# Patient Record
Sex: MALE | Race: BLACK OR AFRICAN AMERICAN | NOT HISPANIC OR LATINO | URBAN - METROPOLITAN AREA
[De-identification: names, ages, dates, MRNs, and addresses within clinical notes are randomized per-mention and may not be internally consistent; named-entity substitution may affect disease eponyms.]

---

## 2020-08-27 ENCOUNTER — CLINICAL SUPPORT (OUTPATIENT)
Dept: URGENT CARE | Facility: CLINIC | Age: 35
End: 2020-08-27

## 2020-08-27 DIAGNOSIS — Z02.89 ENCOUNTER FOR EXAMINATION REQUIRED BY DEPARTMENT OF TRANSPORTATION (DOT): Primary | ICD-10-CM

## 2020-08-27 PROCEDURE — 99499 UNLISTED E&M SERVICE: CPT | Mod: S$GLB,,, | Performed by: PHYSICIAN ASSISTANT

## 2020-08-27 PROCEDURE — 99499 PHYSICAL, RECERT DOT/CDL: ICD-10-PCS | Mod: S$GLB,,, | Performed by: PHYSICIAN ASSISTANT

## 2021-04-05 ENCOUNTER — TELEPHONE (OUTPATIENT)
Dept: PAIN MEDICINE | Facility: CLINIC | Age: 36
End: 2021-04-05

## 2021-04-26 ENCOUNTER — OFFICE VISIT (OUTPATIENT)
Dept: PAIN MEDICINE | Facility: CLINIC | Age: 36
End: 2021-04-26
Payer: OTHER GOVERNMENT

## 2021-04-26 VITALS
OXYGEN SATURATION: 95 % | SYSTOLIC BLOOD PRESSURE: 131 MMHG | DIASTOLIC BLOOD PRESSURE: 82 MMHG | BODY MASS INDEX: 44.09 KG/M2 | WEIGHT: 308 LBS | HEART RATE: 70 BPM | HEIGHT: 70 IN

## 2021-04-26 DIAGNOSIS — M51.36 DDD (DEGENERATIVE DISC DISEASE), LUMBAR: Primary | ICD-10-CM

## 2021-04-26 DIAGNOSIS — M47.816 LUMBAR SPONDYLOSIS: ICD-10-CM

## 2021-04-26 PROCEDURE — 99213 OFFICE O/P EST LOW 20 MIN: CPT | Mod: PBBFAC,PN | Performed by: PAIN MEDICINE

## 2021-04-26 PROCEDURE — 99999 PR PBB SHADOW E&M-EST. PATIENT-LVL III: ICD-10-PCS | Mod: PBBFAC,,, | Performed by: PAIN MEDICINE

## 2021-04-26 PROCEDURE — 99204 PR OFFICE/OUTPT VISIT, NEW, LEVL IV, 45-59 MIN: ICD-10-PCS | Mod: S$PBB,,, | Performed by: PAIN MEDICINE

## 2021-04-26 PROCEDURE — 99204 OFFICE O/P NEW MOD 45 MIN: CPT | Mod: S$PBB,,, | Performed by: PAIN MEDICINE

## 2021-04-26 PROCEDURE — 99999 PR PBB SHADOW E&M-EST. PATIENT-LVL III: CPT | Mod: PBBFAC,,, | Performed by: PAIN MEDICINE

## 2023-03-22 ENCOUNTER — LAB VISIT (OUTPATIENT)
Dept: LAB | Facility: HOSPITAL | Age: 38
End: 2023-03-22
Attending: UROLOGY
Payer: OTHER GOVERNMENT

## 2023-03-22 ENCOUNTER — OFFICE VISIT (OUTPATIENT)
Dept: UROLOGY | Facility: CLINIC | Age: 38
End: 2023-03-22
Payer: OTHER GOVERNMENT

## 2023-03-22 ENCOUNTER — TELEPHONE (OUTPATIENT)
Dept: UROLOGY | Facility: CLINIC | Age: 38
End: 2023-03-22
Payer: OTHER GOVERNMENT

## 2023-03-22 VITALS
HEART RATE: 83 BPM | SYSTOLIC BLOOD PRESSURE: 132 MMHG | HEIGHT: 70 IN | DIASTOLIC BLOOD PRESSURE: 84 MMHG | BODY MASS INDEX: 45.1 KG/M2 | WEIGHT: 315 LBS

## 2023-03-22 DIAGNOSIS — E29.1 TESTICULAR FAILURE: ICD-10-CM

## 2023-03-22 DIAGNOSIS — E29.1 TESTICULAR FAILURE: Primary | ICD-10-CM

## 2023-03-22 DIAGNOSIS — I86.1 VARICOCELE: ICD-10-CM

## 2023-03-22 LAB
ESTRADIOL SERPL-MCNC: 41 PG/ML (ref 11–44)
FSH SERPL-ACNC: 2.77 MIU/ML (ref 0.95–11.95)
LH SERPL-ACNC: 2.4 MIU/ML (ref 0.6–12.1)
PROLACTIN SERPL IA-MCNC: 9.6 NG/ML (ref 3.5–19.4)
TESTOST SERPL-MCNC: 296 NG/DL (ref 304–1227)

## 2023-03-22 PROCEDURE — 84146 ASSAY OF PROLACTIN: CPT | Performed by: UROLOGY

## 2023-03-22 PROCEDURE — 83002 ASSAY OF GONADOTROPIN (LH): CPT | Performed by: UROLOGY

## 2023-03-22 PROCEDURE — 83001 ASSAY OF GONADOTROPIN (FSH): CPT | Performed by: UROLOGY

## 2023-03-22 PROCEDURE — 99203 PR OFFICE/OUTPT VISIT, NEW, LEVL III, 30-44 MIN: ICD-10-PCS | Mod: S$PBB,,, | Performed by: UROLOGY

## 2023-03-22 PROCEDURE — 99999 PR PBB SHADOW E&M-EST. PATIENT-LVL III: ICD-10-PCS | Mod: PBBFAC,,, | Performed by: UROLOGY

## 2023-03-22 PROCEDURE — 99999 PR PBB SHADOW E&M-EST. PATIENT-LVL III: CPT | Mod: PBBFAC,,, | Performed by: UROLOGY

## 2023-03-22 PROCEDURE — 84403 ASSAY OF TOTAL TESTOSTERONE: CPT | Performed by: UROLOGY

## 2023-03-22 PROCEDURE — 99203 OFFICE O/P NEW LOW 30 MIN: CPT | Mod: S$PBB,,, | Performed by: UROLOGY

## 2023-03-22 PROCEDURE — 82670 ASSAY OF TOTAL ESTRADIOL: CPT | Performed by: UROLOGY

## 2023-03-22 PROCEDURE — 99213 OFFICE O/P EST LOW 20 MIN: CPT | Mod: PBBFAC | Performed by: UROLOGY

## 2023-03-22 PROCEDURE — 36415 COLL VENOUS BLD VENIPUNCTURE: CPT | Performed by: UROLOGY

## 2023-03-22 RX ORDER — OMEPRAZOLE 20 MG/1
1 CAPSULE, DELAYED RELEASE ORAL
COMMUNITY
Start: 2022-09-19

## 2023-03-22 RX ORDER — IBUPROFEN 400 MG/1
1 TABLET ORAL
COMMUNITY
Start: 2022-09-19

## 2023-03-22 RX ORDER — TERBINAFINE HYDROCHLORIDE 250 MG/1
1 TABLET ORAL DAILY
COMMUNITY
Start: 2023-01-04

## 2023-03-22 RX ORDER — DIAPER,BRIEF,INFANT-TODD,DISP
1 EACH MISCELLANEOUS
COMMUNITY
Start: 2023-03-13

## 2023-03-22 NOTE — PROGRESS NOTES
"  Chief Complaint:  Infertility    HPI:    Mr. Daniels is a 37 y.o.  male who has been  to his wife for the past 4 years. They have been trying to achieve a pregnancy for the past 6 years but without success. Alvaro Daniels has not undergone a semen analysis. He denies a history of erectile dysfunction and ejaculatory problems.    He has achieved 2 pregnancies in the past with a different partner.    Rosalba Daniels is 37 years old. ( [birth year]) Her menses are irregular. She has not undergone prior hysterosalpingogram. She has achieved 1 prior pregnancies with a different partner.  She sees Dr. Deepti Leggett.    The couple has not undergone prior intrauterine insemination procedures.    The couple has not undergone prior in-vitro fertilization procedures.    Alvaro Daniels denies a history of exposure to harmful chemicals, toxins, and radiation.    No history of recent fevers greater than 101.5 degrees Farenheit.    No history of recent exposure to "wet heat."    No history of urological trauma or testicular torsion.    No history of prostatitis, epididymitis, and orchitis.    No history of post-pubertal mumps.    There is no known family history of fertility problems.    REVIEW OF SYSTEMS:     He denies headache, blurred vision, fever, nausea, vomiting, chills, abdominal pain, chest pain, sore throat, bleeding per rectum, cough, SOB, recent loss of consciousness, recent mental status changes, seizures, dizziness, or upper or lower extremity weakness.    PHYSICAL EXAM:     The patient generally appears in good health, is appropriately interactive, and is in no apparent distress.     Eyes: anicteric sclerae, moist conjunctivae; no lid-lag; PERRLA     HENT: Atraumatic; oropharynx clear with moist mucous membranes and no mucosal ulcerations;normal hard and soft palate.  No evidence of lymphadenopathy.    Neck: Trachea midline.  No thyromegaly.    Skin: No lesions.    Mental: Cooperative with normal " "affect.  Is oriented to time, place, and person.    Neuro: Grossly intact.    Chest: Normal inspiratory effort.   No accessory muscles.  No audible wheezes.  Respirations symmetric on inspiration and expiration.    Heart: Regular rhythm.      Abdomen:  Soft, non-tender. No masses or organomegaly. Bladder is not palpable. No evidence of flank discomfort. No evidence of inguinal hernia.    Genitourinary: Penis is normal with no evidence of plaques or induration. Urethral meatus is normal. Scrotum is normal. Testes are descended bilaterally with no evidence of abnormal masses or tenderness. Epididymis, vas deferens, and cord structures are normal bilaterally.  Testicular volume is approximately 20 cc on the R and 19 cc on the L.  He has a L grade II varicocele.    Extremities: No cyanosis, clubbing, or edema.    IMPRESSION & PLAN:    Mr. Daniels is a 37 y.o.  male who has been  to his wife for the past 4 years. They have been trying to achieve a pregnancy for the past 6 years but without success. Alvaro Daniels has not undergone a semen analysis. He denies a history of erectile dysfunction and ejaculatory problems.    He has achieved 2 pregnancies in the past with a different partner.    He has a L grade II varicocele.    1.  FSH, LH, testosterone, prolactin, and estradiol serum levels today.  2.  Semen analysis x 2.  3.  Return to the clinic in 3 weeks to discuss test results and treatment plan.  4.  Recommend avoiding "wet heat."  5.  Recommend taking a multivitamin and 500 mg of vitamin c daily in addition to the multivitamin.  6.  Please send a copy of the note to Dr. Leggett.  Thank you for the consultation.  7.  Discussed varicocele's potential impact on fertility.     CC: Betsey    "

## 2023-03-22 NOTE — TELEPHONE ENCOUNTER
----- Message from Alayna Solares sent at 3/22/2023 10:18 AM CDT -----  Regarding: Referral  Good Morning      [facility] Alan[facility] at [facility] would like to refer the following patient to  in the Urology department. The patients diagnosis is male infertility. I have scanned the patients referral and records into .     .  Thank you,   Alayna Christianson

## 2023-03-22 NOTE — TELEPHONE ENCOUNTER
Call placed to patient. Name and date of birth verified. Patient requesting an appointment for fertility. Patient appointment scheduled and noted in epic. Patient informed appointment details are noted in patient portal. Patient verbalized understanding.

## 2023-03-22 NOTE — LETTER
March 22, 2023        Deepti Leggett MD  [address]  [address]  [facility] [address]             [facility] [address]  [address]  [place] [address]  Phone: [phone]   Patient: Alvaro Daniels   MR Number: [id number]   YOB: 1985   Date of Visit: 3/22/2023       Dear Dr. Leggett:    Thank you for referring Alvaro Daniels to me for evaluation. Attached you will find relevant portions of my assessment and plan of care.    If you have questions, please do not hesitate to call me. I look forward to following Alvaro Daniels along with you.    Sincerely,      Justin Laguna MD            CC  No Recipients    Enclosure

## 2023-03-23 ENCOUNTER — TELEPHONE (OUTPATIENT)
Dept: UROLOGY | Facility: CLINIC | Age: 38
End: 2023-03-23
Payer: OTHER GOVERNMENT

## 2023-03-23 DIAGNOSIS — E29.1 TESTICULAR FAILURE: Primary | ICD-10-CM

## 2023-03-23 NOTE — TELEPHONE ENCOUNTER
Call placed to patient, name and date of birth verified. Patient informed of the following:    Please notify T is low.  It should be repeated between 7-10 am  -Dr. Laguna    Patient lab scheduled and noted in epic. Patient informed appointment details are noted in patient portal. Patient verbalized understanding.

## 2023-03-24 ENCOUNTER — LAB VISIT (OUTPATIENT)
Dept: LAB | Facility: HOSPITAL | Age: 38
End: 2023-03-24
Attending: UROLOGY
Payer: OTHER GOVERNMENT

## 2023-03-24 DIAGNOSIS — E29.1 TESTICULAR FAILURE: ICD-10-CM

## 2023-03-24 LAB — TESTOST SERPL-MCNC: 350 NG/DL (ref 304–1227)

## 2023-03-24 PROCEDURE — 84403 ASSAY OF TOTAL TESTOSTERONE: CPT | Performed by: UROLOGY

## 2023-03-24 PROCEDURE — 36415 COLL VENOUS BLD VENIPUNCTURE: CPT | Performed by: UROLOGY

## 2023-12-14 ENCOUNTER — TELEPHONE (OUTPATIENT)
Dept: UROLOGY | Facility: CLINIC | Age: 38
End: 2023-12-14
Payer: OTHER GOVERNMENT

## 2023-12-14 NOTE — TELEPHONE ENCOUNTER
----- Message from Pauline Contreras sent at 12/14/2023 10:54 AM CST -----  Good Morning,     [facility] would like to refer the following patient to Dr. Laguna. The patients diagnosis is Male infertility, unspecified. I have scanned the patients referral and records into . Patient is establish, please schedule     Thank you,    Pauline Christianson